# Patient Record
Sex: FEMALE | URBAN - METROPOLITAN AREA
[De-identification: names, ages, dates, MRNs, and addresses within clinical notes are randomized per-mention and may not be internally consistent; named-entity substitution may affect disease eponyms.]

---

## 2017-02-20 ENCOUNTER — IMPORTED ENCOUNTER (OUTPATIENT)
Dept: URBAN - METROPOLITAN AREA CLINIC 1 | Facility: CLINIC | Age: 62
End: 2017-02-20

## 2017-02-20 PROBLEM — Z96.1: Noted: 2017-02-20

## 2017-02-20 PROBLEM — H16.143: Noted: 2017-02-20

## 2017-02-20 PROBLEM — H35.013: Noted: 2017-02-20

## 2017-02-20 PROBLEM — H04.123: Noted: 2017-02-20

## 2017-02-20 PROBLEM — H10.45: Noted: 2017-02-20

## 2017-02-20 PROCEDURE — 92014 COMPRE OPH EXAM EST PT 1/>: CPT

## 2017-02-20 NOTE — PATIENT DISCUSSION
1.  CARLENE w/ PEK OU- The increase of artificial tears OU to QID were recommended. 2.  Allergic Conjunctivitis OU- Start Zaditor OU BID. The condition was  discussed with the patient. Avoidance of allergens and cool compresses were recommended. 3. GR I Athero Vascular Dz OU- Stable. 4.  Pseudophakia OU- H/o Yag Ou.5. Return for an appointment for a 27 in 1 year with Dr. Artis Williamosn.

## 2022-04-02 ASSESSMENT — VISUAL ACUITY
OD_SC: 20/20
OD_CC: 20/40+1
OS_SC: 20/20
OS_CC: 20/30+1

## 2022-04-02 ASSESSMENT — TONOMETRY
OD_IOP_MMHG: 15
OS_IOP_MMHG: 15